# Patient Record
(demographics unavailable — no encounter records)

---

## 2019-01-01 NOTE — DS.PDOC
[facility] Discharge Summary


General


Date of Birth


19


Date of Discharge


2019





Problem List


Problems:  


(1) Liveborn infant by vaginal delivery





Procedures During Visit


Circumcision, Hearing screen and BiliChek were performed.





History


This is a baby boy born at 39 and 4 weeks of gestational age via vaginal 

delivery to a 35-year-old  (G) 3 para (P) 1 -0 -1-1 mother who is blood 

type A positive, hepatitis B negative, rapid plasma reagin (RPR) negative, HIV 

negative, group B Streptococcus negative. Baby cried at birth. Apgar scores were

9 at one minute and 9 at five minutes. Baby was admitted to the Mother-Baby 

unit.





Exam on Admission to Nursery


Measurements on Admission


On admission, the baby's weight is 3910 grams, length is 53 cm, and head 

circumference is 34 cm.


General:  Positive: Active; 


   Negative: Respiratory Distress, Dysmorphic Features


HEENT:  Positive: Normocephalic, Anterior [place] Open, Positive Red Reflexes

Dinesh, Nares Patent, Ears Well Formed, Ears Well Set; 


   Negative: Cleft Lip, Cleft Palate


Heart:  Positive: S1,S2; 


   Negative: Murmur


Lungs:  Positive: Good Bilateral Air Entry; 


   Negative: Grunting and Retractions, Tachypnea


Abdomen:  Positive: Soft, Bowel sounds Present; 


   Negative: Distended


Male Genitalia:  Positive: Nl Term Male Genitalia


Anus:  Positive: Patent


Extremities:  Positive: Full ROM Times 4, Femoral Pulses; 


   Negative: Hip Click


Skin:  Positive: Normal for Gestation, Normal Capillary Refill


Neurological:  POSITIVE: Good Tone, Positive [place] Reflex, Positive Suck Reflex, 

Positive Grasp Reflex





Summary Text


On the day of discharge, the baby's weight is 3645 grams and the baby is breast 

feeding well ad helen.


Physical Examination was within normal limits and circumcision is healing well, 

continue to apply Vaseline as directed.


The baby passed a hearing screen, received the first dose of hepatitis B vaccine

on 2019.  Bilirubin check is 4.2 at at 46 hours of life.


Discharge baby home with mother, followup as scheduled by parents with [facility].











JOHN CASE DO                Aug 11, 2019 10:43

## 2019-01-01 NOTE — ROPEDSPDOC
Peds Procedure Note


Procedure


DATE OF PROCEDURE: 8/10/19 








PROCEDURE: Circumcision











DESCRIPTION OF PROCEDURE: Informed consent was obtained from mother. Area was 

cleaned and sterilely draped. Lidocaine 0.6 mL's injected subcutaneously at the 

base of the penis for anesthesia. Circumcision was performed using a 1.45 Gom[place] 

clamp.


Total blood loss less than 0.5 mL.


Baby tolerated procedure well.


Parents Taught how to change dressing.











JOHN CASE DO                Aug 10, 2019 17:56

## 2019-01-01 NOTE — NBADM
[place] Admission Note


Date of Admission


Aug 9, 2019 at 07:50





History


This is a baby boy born at 39 and 4 weeks of gestational age via vaginal 

delivery to a 35-year-old  (G) 3 para (P) 1 -0 -1-1 mother who is blood 

type A positive, hepatitis B negative, rapid plasma reagin (RPR) negative, HIV 

negative, group B Streptococcus negative. Baby cried at birth. Apgar scores were

9 at one minute and 9 at five minutes. Baby was admitted to the Mother-Baby 

unit.





Physical Examination


Physical Measurements


On admission, the baby's weight is 3910 grams, length is 53 cm, and head 

circumference is 34 cm.


Vital Signs





Vital Signs








  Date Time  Temp Pulse Resp B/P (MAP) Pulse Ox O2 Delivery O2 Flow Rate FiO2


 


19 09:00 99.1 150 58 96/37 (56)    








General:  Positive: Active; 


   Negative: Respiratory Distress, Dysmorphic Features


HEENT:  Positive: Normocephalic, Anterior [place] Open, Positive Red Reflexes

Dinesh, Nares Patent, Ears Well Formed, Ears Well Set; 


   Negative: Cleft Lip, Cleft Palate


Heart:  Positive: S1,S2; 


   Negative: Murmur


Lungs:  Positive: Good Bilateral Air Entry; 


   Negative: Grunting and Retractions, Tachypnea


Abdomen:  Positive: Soft, Bowel sounds Present; 


   Negative: Distended


Male Genitalia:  Positive: Nl Term Male Genitalia


Anus:  Positive: Patent


Extremities:  Positive: Full ROM Times 4, Femoral Pulses; 


   Negative: Hip Click


Skin:  Positive: Normal for Gestation, Normal Capillary Refill


Neurological:  POSITIVE: Good Tone, Positive [place] Reflex, Positive Suck Reflex, 

Positive Grasp Reflex





Asessment


Problems:  


(1) Liveborn infant by vaginal delivery





Plan


1. Admit to mother-baby unit.


2. Routine  care.


3. Parents updated on condition and plan for the baby.











JOHN CASE DO                 Aug 9, 2019 12:51

## 2019-01-01 NOTE — IPNPDOC
Text Note


Date of Service


The patient was seen on 8/10/19.





NOTE


DOL #1:





Baby seen and examined on 8/10.


Doing well, feeding well, passing urine and stool.


Physical exam is within normal limits.





Plan:


- Continue routine  care.





VS,Fishbone, I+O


VS, Fishbone, I+O





Vital Signs








  Date Time  Temp Pulse Resp B/P (MAP) Pulse Ox O2 Delivery O2 Flow Rate FiO2


 


19 07:30 98.8 122 56     


 


8/10/19 12:25     97   





     97   


 


19 09:00    96/37 (56)    

















JOHN CASE DO                Aug 11, 2019 10:41